# Patient Record
Sex: MALE | Race: WHITE | NOT HISPANIC OR LATINO
[De-identification: names, ages, dates, MRNs, and addresses within clinical notes are randomized per-mention and may not be internally consistent; named-entity substitution may affect disease eponyms.]

---

## 2018-01-29 PROBLEM — Z00.00 ENCOUNTER FOR PREVENTIVE HEALTH EXAMINATION: Status: ACTIVE | Noted: 2018-01-29

## 2018-02-05 ENCOUNTER — APPOINTMENT (OUTPATIENT)
Dept: RADIOLOGY | Facility: CLINIC | Age: 43
End: 2018-02-05

## 2018-02-05 ENCOUNTER — APPOINTMENT (OUTPATIENT)
Dept: ULTRASOUND IMAGING | Facility: CLINIC | Age: 43
End: 2018-02-05

## 2018-02-06 ENCOUNTER — OUTPATIENT (OUTPATIENT)
Dept: OUTPATIENT SERVICES | Facility: HOSPITAL | Age: 43
LOS: 1 days | End: 2018-02-06
Payer: COMMERCIAL

## 2018-02-06 ENCOUNTER — APPOINTMENT (OUTPATIENT)
Dept: ULTRASOUND IMAGING | Facility: HOSPITAL | Age: 43
End: 2018-02-06
Payer: COMMERCIAL

## 2018-02-06 DIAGNOSIS — R20.0 ANESTHESIA OF SKIN: ICD-10-CM

## 2018-02-06 PROCEDURE — 72052 X-RAY EXAM NECK SPINE 6/>VWS: CPT | Mod: 26

## 2018-02-06 PROCEDURE — 93931 UPPER EXTREMITY STUDY: CPT | Mod: 26

## 2018-02-06 PROCEDURE — 93971 EXTREMITY STUDY: CPT | Mod: 26

## 2018-02-06 PROCEDURE — 72052 X-RAY EXAM NECK SPINE 6/>VWS: CPT

## 2018-02-06 PROCEDURE — 93931 UPPER EXTREMITY STUDY: CPT

## 2018-02-06 PROCEDURE — 93971 EXTREMITY STUDY: CPT

## 2024-02-26 ENCOUNTER — APPOINTMENT (OUTPATIENT)
Dept: UROLOGY | Facility: CLINIC | Age: 49
End: 2024-02-26
Payer: COMMERCIAL

## 2024-02-26 ENCOUNTER — TRANSCRIPTION ENCOUNTER (OUTPATIENT)
Age: 49
End: 2024-02-26

## 2024-02-26 VITALS
BODY MASS INDEX: 29.55 KG/M2 | HEART RATE: 74 BPM | TEMPERATURE: 98.2 F | RESPIRATION RATE: 17 BRPM | DIASTOLIC BLOOD PRESSURE: 77 MMHG | WEIGHT: 195 LBS | SYSTOLIC BLOOD PRESSURE: 128 MMHG | HEIGHT: 68 IN

## 2024-02-26 DIAGNOSIS — Z78.9 OTHER SPECIFIED HEALTH STATUS: ICD-10-CM

## 2024-02-26 DIAGNOSIS — N50.811 RIGHT TESTICULAR PAIN: ICD-10-CM

## 2024-02-26 PROCEDURE — 99205 OFFICE O/P NEW HI 60 MIN: CPT

## 2024-02-26 NOTE — HISTORY OF PRESENT ILLNESS
[FreeTextEntry1] : Is a pleasant 49 with Mr Grayson is a very pleasant 49-year-old male  working in the [place] who presents with a several day history right inguinal and testicular pain that began about 5 days ago and is no longer present.  The patient states that the discomfort began during a working day and was of gradual onset.  The patient felt the right testicle was swollen and sensitive particularly on the upper aspect.  The discomfort settled down over the next day or 2.  He believes that, at the time of onset, his urine was dark and he was concerned about some blood being present.  The patient had no associated fevers or any constitutional symptoms.  There was no radiation of the pain.  There is no flank pain or dysuria described.  No history of STI.  The patient has no irritative or obstructive voiding symptoms.  There was some questionable history of a urinary tract infection in the past.  There is no history of STI.  There are no previous episodes of this type of discomfort.  There is no family history for prostate cancer.  The patient's past medical history past surgical history and review of systems were reviewed today and can be found in the patient's electronic medical record.  Physical examination shows the patient to be oriented x 3 neuro grossly intact normal gait.  Abdominal exam shows no distention tympany masses or organomegaly.  There is no suprapubic tenderness identified.  Palpation along the inguinal rings did not elicit pain and no fascial defects were identified with Valsalva maneuvers.  No genital cutaneous lesions were found.  The phallus was circumcised no urethral discharge present.  Scrotal examination showed normal rugation.  Right testicle slightly lower than the left.  Both testes were approximately 18 to 20 cc.  No masses nontender.  Right and left epididymis without tenderness or mass lesions.  Cord structures normal to palpation.  No varicoceles identified.  Digital rectal examination showed normal sphincter tone.  Levator plate supple.  No trigger points.  Prostate approximately 20 cc nontender.  No induration.  In summary Oral presents with a short history of right-sided testicular pain which is no longer present.  There is also a vague history of gross hematuria and I had a long discussion with Oral about standard workup for this specific issue.  Differential diagnosis includes items such as epididymitis, self resolved but other etiologies of pain may include varicocele, intermittent torsion, stone passage.  Today we will be sending off urinalysis and culture and scheduling the patient for scrotal ultrasonography and with reference to possible hematuria, CT urogram.  We discussed returning for follow-up cystoscopic examination.  The patient was a bit leery about proceeding and we will make final determination on the patient's next visit.

## 2024-02-27 ENCOUNTER — NON-APPOINTMENT (OUTPATIENT)
Age: 49
End: 2024-02-27

## 2024-02-27 LAB
ANION GAP SERPL CALC-SCNC: 11 MMOL/L
APPEARANCE: CLEAR
BACTERIA: NEGATIVE /HPF
BILIRUBIN URINE: NEGATIVE
BLOOD URINE: ABNORMAL
BUN SERPL-MCNC: 20 MG/DL
CALCIUM SERPL-MCNC: 9.3 MG/DL
CAST: 1 /LPF
CHLORIDE SERPL-SCNC: 103 MMOL/L
CO2 SERPL-SCNC: 26 MMOL/L
COLOR: YELLOW
CREAT SERPL-MCNC: 1.21 MG/DL
EGFR: 74 ML/MIN/1.73M2
EPITHELIAL CELLS: 8 /HPF
GLUCOSE QUALITATIVE U: NEGATIVE MG/DL
GLUCOSE SERPL-MCNC: 89 MG/DL
KETONES URINE: NEGATIVE MG/DL
LEUKOCYTE ESTERASE URINE: ABNORMAL
MICROSCOPIC-UA: NORMAL
NITRITE URINE: NEGATIVE
PH URINE: 5.5
POTASSIUM SERPL-SCNC: 4.6 MMOL/L
PROTEIN URINE: NORMAL MG/DL
RED BLOOD CELLS URINE: 2 /HPF
REVIEW: NORMAL
SODIUM SERPL-SCNC: 140 MMOL/L
SPECIFIC GRAVITY URINE: >1.03
UROBILINOGEN URINE: 0.2 MG/DL
WHITE BLOOD CELLS URINE: 38 /HPF

## 2024-02-28 DIAGNOSIS — N39.0 URINARY TRACT INFECTION, SITE NOT SPECIFIED: ICD-10-CM

## 2024-02-28 LAB — BACTERIA UR CULT: ABNORMAL

## 2024-02-28 RX ORDER — SULFAMETHOXAZOLE AND TRIMETHOPRIM 800; 160 MG/1; MG/1
800-160 TABLET ORAL
Qty: 14 | Refills: 0 | Status: ACTIVE | COMMUNITY
Start: 2024-02-28 | End: 1900-01-01

## 2024-03-06 ENCOUNTER — APPOINTMENT (OUTPATIENT)
Dept: ULTRASOUND IMAGING | Facility: CLINIC | Age: 49
End: 2024-03-06
Payer: COMMERCIAL

## 2024-03-06 ENCOUNTER — OUTPATIENT (OUTPATIENT)
Dept: OUTPATIENT SERVICES | Facility: HOSPITAL | Age: 49
LOS: 1 days | End: 2024-03-06
Payer: COMMERCIAL

## 2024-03-06 ENCOUNTER — APPOINTMENT (OUTPATIENT)
Dept: CT IMAGING | Facility: CLINIC | Age: 49
End: 2024-03-06
Payer: COMMERCIAL

## 2024-03-06 DIAGNOSIS — N50.811 RIGHT TESTICULAR PAIN: ICD-10-CM

## 2024-03-06 PROCEDURE — 74178 CT ABD&PLV WO CNTR FLWD CNTR: CPT

## 2024-03-06 PROCEDURE — 76870 US EXAM SCROTUM: CPT | Mod: 26

## 2024-03-06 PROCEDURE — 76870 US EXAM SCROTUM: CPT

## 2024-03-06 PROCEDURE — 74178 CT ABD&PLV WO CNTR FLWD CNTR: CPT | Mod: 26

## 2024-03-27 ENCOUNTER — APPOINTMENT (OUTPATIENT)
Dept: UROLOGY | Facility: CLINIC | Age: 49
End: 2024-03-27

## 2025-07-18 ENCOUNTER — APPOINTMENT (OUTPATIENT)
Dept: OTOLARYNGOLOGY | Facility: CLINIC | Age: 50
End: 2025-07-18
Payer: COMMERCIAL

## 2025-07-18 VITALS — HEIGHT: 69 IN | BODY MASS INDEX: 28.88 KG/M2 | WEIGHT: 195 LBS

## 2025-07-18 PROCEDURE — 99203 OFFICE O/P NEW LOW 30 MIN: CPT | Mod: 25

## 2025-07-18 PROCEDURE — 69210 REMOVE IMPACTED EAR WAX UNI: CPT | Mod: RT
